# Patient Record
(demographics unavailable — no encounter records)

---

## 2025-04-11 NOTE — PHYSICAL EXAM
[de-identified] : b copious cerumen impaction removed atraumatically r with suction. L under microscope with hook. [de-identified] : ar [de-identified] : ar [Normal] : no rashes [de-identified] : gait steady

## 2025-04-11 NOTE — HISTORY OF PRESENT ILLNESS
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 Denies drainage. has b ear itching. Denies hl. or tinnitus. Denies past h/o ear problems. Has some noise exposure playing [employer]. She also has some nasal congestion and drip. Cat. No fh or sh relevant to cc.

## 2025-04-11 NOTE — HISTORY OF PRESENT ILLNESS
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 Denies drainage. has b ear itching. Denies hl. or tinnitus. Denies past h/o ear problems. Has some noise exposure playing [employer]. She also has some nasal congestion and drip. Cat. No fh or sh relevant to cc.

## 2025-04-11 NOTE — PROCEDURE
[Cerumen Impaction] : Cerumen Impaction [Same] : same as the Pre Op Dx. [] : Removal of Cerumen [FreeTextEntry6] : r soft cerumen impaction removed atraumatically with suction. L required microscope - hard impaction. She jumped as it was touched gently. Cerumen removed with hook. Tm intact but some injection

## 2025-04-11 NOTE — PHYSICAL EXAM
[de-identified] : b copious cerumen impaction removed atraumatically r with suction. L under microscope with hook. [de-identified] : ar [de-identified] : ar [Normal] : no rashes [de-identified] : gait steady

## 2025-04-11 NOTE — ASSESSMENT
[FreeTextEntry1] : cerumen removed debrox AS 3d for injection she prefers no abx drops rtc 1 week to recheck ear  recommended -she preferred to hold off as she gets these at work flonase for AR avoidance - bathe cat and try to keep out of bedroom leave l earplug out 5d

## 2025-04-18 NOTE — PHYSICAL EXAM
[Nasal Endoscopy Performed] : nasal endoscopy was performed, see procedure section for findings [Midline] : trachea located in midline position [de-identified] : r: tm mildly injected no effusion; l: nl [de-identified] : inferior turbinate hypertrophy [Normal] : no neck adenopathy [de-identified] : gait steady

## 2025-04-18 NOTE — HISTORY OF PRESENT ILLNESS
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 At last visit she was found to have cerumen impaction which was cleared atraumatically. She was advised debrox in left ear for 3 days for mildly injected left TM. She used drops in both ears for the last 7 days. Had improvement to baseline in terms of her ears until today began to experience sensation of fluid in her right ear. She was also advised flonase for allergic rhinitis which she has been using. Admits to increased stress over the last week. Known bruxism. Has hearing checked annually.

## 2025-04-18 NOTE — PROCEDURE
[Topical Lidocaine] : topical lidocaine [Flexible Endoscope] : examined with the flexible endoscope [Normal] : the inferior, middle and superior meatus are normal [Oxymetazoline HCl] : oxymetazoline HCl [Posterior Lesion] : posterior lesion [Anterior rhinoscopy insufficient to account for symptoms] : anterior rhinoscopy insufficient to account for symptoms [de-identified] : done for r me early inflammation/effusion to ro npx process causing it [FreeTextEntry6] : nasal endoscopy done for right ear fullness eval for npx mass found to have fullness of nasopharynx, early nasopharyngitis no mucopurulence and allergic rhinitis - Mucosa- b nl Polyps- b nl Inferior turbinates- b nl Middle turbinates- b nl Superior turbinate- b nl Inferior meatus- b nl Middle meatus- b nl Superior meatus- b nl Spheno-ethmoidal recess- b nl [FreeTextEntry3] : enlarged; also with inflammation consistent with allergies

## 2025-04-18 NOTE — PHYSICAL EXAM
[Nasal Endoscopy Performed] : nasal endoscopy was performed, see procedure section for findings [Midline] : trachea located in midline position [de-identified] : r: tm mildly injected no effusion; l: nl [de-identified] : inferior turbinate hypertrophy [Normal] : no neck adenopathy [de-identified] : gait steady

## 2025-04-18 NOTE — DATA REVIEWED
[de-identified] : audio exam: normal and symmetric up to 8 kHz with small 4 kHz notch AU, significant l asymm in multiple freq (8-20 kHz tested) results reviewed with pt

## 2025-04-18 NOTE — DATA REVIEWED
[de-identified] : audio exam: normal and symmetric up to 8 kHz with small 4 kHz notch AU, significant l asymm in multiple freq (8-20 kHz tested) results reviewed with pt

## 2025-04-18 NOTE — HISTORY OF PRESENT ILLNESS
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 At last visit she was found to have cerumen impaction which was cleared atraumatically. She was advised debrox in left ear for 3 days for mildly injected left TM. She used drops in both ears for the last 7 days. Had improvement to baseline in terms of her ears until today began to experience sensation of fluid in her right ear. She was also advised flonase for allergic rhinitis which she has been using. Admits to increased stress over the last week. Known bruxism. Has hearing checked annually.

## 2025-04-18 NOTE — ASSESSMENT
[FreeTextEntry1] : r ear fullness, r tm mildly injected and mild nasopharyngitis -flonase 2 sprays bid -zpack  asymm l hf snhl -audio exam: normal and symmetric up to 8 kHz with small 4 kHz notch AU, significant l asymm in multiple freq >8 kHz (8-20 kHz tested) -mri brain & iac to r/o cpa pathology had not had one yet  RTC with MRI; sooner if any issues

## 2025-04-18 NOTE — PROCEDURE
[Topical Lidocaine] : topical lidocaine [Flexible Endoscope] : examined with the flexible endoscope [Normal] : the inferior, middle and superior meatus are normal [Oxymetazoline HCl] : oxymetazoline HCl [Posterior Lesion] : posterior lesion [Anterior rhinoscopy insufficient to account for symptoms] : anterior rhinoscopy insufficient to account for symptoms [de-identified] : done for r me early inflammation/effusion to ro npx process causing it [FreeTextEntry6] : nasal endoscopy done for right ear fullness eval for npx mass found to have fullness of nasopharynx, early nasopharyngitis no mucopurulence and allergic rhinitis - Mucosa- b nl Polyps- b nl Inferior turbinates- b nl Middle turbinates- b nl Superior turbinate- b nl Inferior meatus- b nl Middle meatus- b nl Superior meatus- b nl Spheno-ethmoidal recess- b nl [FreeTextEntry3] : enlarged; also with inflammation consistent with allergies